# Patient Record
Sex: MALE | Race: BLACK OR AFRICAN AMERICAN | Employment: FULL TIME
[De-identification: names, ages, dates, MRNs, and addresses within clinical notes are randomized per-mention and may not be internally consistent; named-entity substitution may affect disease eponyms.]

---

## 2020-03-20 ENCOUNTER — NURSE TRIAGE (OUTPATIENT)
Dept: OTHER | Facility: CLINIC | Age: 40
End: 2020-03-20

## 2020-03-20 ENCOUNTER — HOSPITAL ENCOUNTER (EMERGENCY)
Age: 40
Discharge: HOME | End: 2020-03-21
Attending: EMERGENCY MEDICINE
Payer: COMMERCIAL

## 2020-03-20 PROCEDURE — 99285 EMERGENCY DEPT VISIT HI MDM: CPT

## 2020-03-21 ENCOUNTER — APPOINTMENT (OUTPATIENT)
Dept: GENERAL RADIOLOGY | Age: 40
End: 2020-03-21
Payer: COMMERCIAL

## 2020-03-21 VITALS
HEART RATE: 99 BPM | OXYGEN SATURATION: 97 % | SYSTOLIC BLOOD PRESSURE: 114 MMHG | TEMPERATURE: 98.4 F | DIASTOLIC BLOOD PRESSURE: 72 MMHG | RESPIRATION RATE: 18 BRPM

## 2020-03-21 LAB
EKG ATRIAL RATE: 83 BPM
EKG DIAGNOSIS: NORMAL
EKG P AXIS: 38 DEGREES
EKG P-R INTERVAL: 164 MS
EKG Q-T INTERVAL: 392 MS
EKG QRS DURATION: 84 MS
EKG QTC CALCULATION (BAZETT): 460 MS
EKG R AXIS: 49 DEGREES
EKG T AXIS: 15 DEGREES
EKG VENTRICULAR RATE: 83 BPM
S PYO AG THROAT QL: NEGATIVE

## 2020-03-21 PROCEDURE — 93005 ELECTROCARDIOGRAM TRACING: CPT

## 2020-03-21 PROCEDURE — 87880 STREP A ASSAY W/OPTIC: CPT

## 2020-03-21 PROCEDURE — 71046 X-RAY EXAM CHEST 2 VIEWS: CPT

## 2020-03-21 PROCEDURE — 6370000000 HC RX 637 (ALT 250 FOR IP): Performed by: EMERGENCY MEDICINE

## 2020-03-21 PROCEDURE — 87081 CULTURE SCREEN ONLY: CPT

## 2020-03-21 RX ORDER — IBUPROFEN 800 MG/1
800 TABLET ORAL ONCE
Status: COMPLETED | OUTPATIENT
Start: 2020-03-21 | End: 2020-03-21

## 2020-03-21 RX ORDER — KETOROLAC TROMETHAMINE 30 MG/ML
30 INJECTION, SOLUTION INTRAMUSCULAR; INTRAVENOUS ONCE
Status: DISCONTINUED | OUTPATIENT
Start: 2020-03-21 | End: 2020-03-21

## 2020-03-21 RX ORDER — NAPROXEN 500 MG/1
500 TABLET ORAL 2 TIMES DAILY PRN
Qty: 20 TABLET | Refills: 0 | Status: SHIPPED | OUTPATIENT
Start: 2020-03-21 | End: 2020-03-28

## 2020-03-21 RX ORDER — ACETAMINOPHEN 500 MG
1000 TABLET ORAL ONCE
Status: COMPLETED | OUTPATIENT
Start: 2020-03-21 | End: 2020-03-21

## 2020-03-21 RX ORDER — LIDOCAINE HYDROCHLORIDE 20 MG/ML
15 SOLUTION OROPHARYNGEAL ONCE
Status: DISCONTINUED | OUTPATIENT
Start: 2020-03-21 | End: 2020-03-21

## 2020-03-21 RX ADMIN — IBUPROFEN 800 MG: 800 TABLET, FILM COATED ORAL at 00:58

## 2020-03-21 RX ADMIN — ACETAMINOPHEN 1000 MG: 500 TABLET ORAL at 00:58

## 2020-03-21 ASSESSMENT — PAIN SCALES - GENERAL
PAINLEVEL_OUTOF10: 1
PAINLEVEL_OUTOF10: 1

## 2020-03-21 ASSESSMENT — PAIN DESCRIPTION - LOCATION: LOCATION: HEAD

## 2020-03-21 ASSESSMENT — PAIN DESCRIPTION - PAIN TYPE: TYPE: ACUTE PAIN

## 2020-03-21 ASSESSMENT — PAIN DESCRIPTION - DESCRIPTORS: DESCRIPTORS: HEADACHE

## 2020-03-21 NOTE — TELEPHONE ENCOUNTER
Reason for Disposition   SEVERE chest pain    Protocols used: CHEST PAIN-ADULT-AH    Pt's wife states pt has had a sore throat since Sunday and  Was sent home  From work. Since this morning he has developed chest pain that he is experiencing currently. Given disposition recommendation.

## 2020-03-21 NOTE — ED PROVIDER NOTES
Date Taking? Authorizing Provider   meloxicam (MOBIC) 7.5 MG tablet Take 1 tablet by mouth daily 3/25/16   Fadumo Fox MD       Allergies as of 03/20/2020    (No Known Allergies)       Past Medical History:   Diagnosis Date    GERD (gastroesophageal reflux disease)     gerd    Migraine     Psychiatric problem     depression anxiety    Tachycardia         Surgical History: History reviewed. No pertinent surgical history.      Family History:    Family History   Problem Relation Age of Onset    High Blood Pressure Mother     High Cholesterol Mother     Cancer Father     Heart Disease Maternal Grandfather     Stroke Maternal Grandfather        Social History     Socioeconomic History    Marital status: Single     Spouse name: Not on file    Number of children: Not on file    Years of education: Not on file    Highest education level: Not on file   Occupational History    Not on file   Social Needs    Financial resource strain: Not on file    Food insecurity     Worry: Not on file     Inability: Not on file   [employer] needs     Medical: Not on file     Non-medical: Not on file   Tobacco Use    Smoking status: Never Smoker    Smokeless tobacco: Never Used   Substance and Sexual Activity    Alcohol use: Yes     Comment: rarely     Drug use: No    Sexual activity: Yes     Partners: Female   Lifestyle    Physical activity     Days per week: Not on file     Minutes per session: Not on file    Stress: Not on file   Relationships    Social connections     Talks on phone: Not on file     Gets together: Not on file     Attends Evangelical service: Not on file     Active member of club or organization: Not on file     Attends meetings of clubs or organizations: Not on file     Relationship status: Not on file    Intimate partner violence     Fear of current or ex partner: Not on file     Emotionally abused: Not on file     Physically abused: Not on file     Forced sexual activity: Not on file him we do not have any testing available. Patient is amenable to doing the EKG and the chest x-ray. Patient understands that if I cannot do the lab draw that I cannot reduce the risks of the chest discomfort is is cardiac related, he understands this but does not want to do that at this time. He understands that as it stands his risk of a cardiac origin for this chest discomfort is significantly elevated at, greater than 10%. - Patient seen and evaluated in room 5    Wells Criteria: To assess patient for likelihood of a pulmonary embolism. Physical findings suggestive of DVT (unilateral leg swelling, calf or thigh tenderness):+0 No  No alternative diagnosis better explains the illness:+0 No  Tachycardia with pulse > 100:+0 No  Immobilization (?3 days) or surgery in the previous four weeks:+0 No  Prior history of DVT or pulmonary embolism:+0 No  Presence of hemoptysis:+0 No  Presence of malignancy:+0 No    Pulmonary embolism risk score interpretation: 0. This falls under the following category: Score of < 2, which indicates a low probability    PERC Rule:  Applicable in this patient who has low clinical suspicion for pulmonary embolism. Age < 48years old: Yes  Heart rate < 100 bpm: Yes  Oxygen saturation > 95%: Yes  Hemoptysis: No  Exogenous estrogen use: No  Prior history of DVT or PE: No  Unilateral leg swelling: No  Surgery or significant trauma in the past 4 weeks: No    Based on the above, PE can effectively be ruled out without further testing. I completed a structured, evidence-based clinical evaluation to screen for pulmonary embolus in this patient. The evidence indicates that the patient is very low risk for pulmonary embolus, and this is consistent with my clinical intuition. The risk of further testing, imaging or hospitalization is likely higher than the risk of the patient having a pulmonary embolus.  It is, therefore, in the best interest of the patient not to do additional emergent testing or be hospitalized. I have discussed with the patient my clinical impression and the result of an evidence-based clinical evaluation to screen for pulmonary embolus, as well as the risk of further testing and hospitalization. The evidence shows that the risk for pulmonary embolus is about 1% or less. Although the risk of pulmonary has not been eliminated, the risks of further testing or hospitalization likely exceed the benefit, and the patient declines further emergent evaluation or hospitalization for pulmonary embolus. THORACIC AORTIC DISSECTION SCREENING (TADS):    Associated New Neuro Deficies?: +0  No  Radial/Femoral Pulses Feel Uneven?: +0  No  Pain Maximal at Onset or Abrupt?: +0  No  Pain severe, ripping, or tearing?: +0  No  Migrates:  Chest, back, or abdomen?: +0  No  Chest XR Normal?: -1 Yes  A normal chest x-ray is defined as 1. Normal mediastinum, and 2. No pleural effusion, and 3. no apical pleural (curb density of the lung apex)    TADS score: < 0. This falls under the following category: Score of 0, odds of aortic dissection is <  1/1000, no further workup needed regarding the aorta and supports discharge    I engaged in a shared decision making discussion with the patient about the risk and potential benefits of CT scanning and they concurred with the plan to proceed without a CT scan as the risk is deemed a outweigh any potential benefit at this time. HEART SCORE: Unable to complete heart score as patient is refusing lab draws      This is a very pleasant patient with chest pain. On physical exam, patient does not have any abnormal heart or lung sounds.   The work up in the ED indicates patient is without significant evidence of acute coronary syndrome, pulmonary embolism, thoracic aortic dissection, pericarditis, pneumothorax, esophageal rupture, pneumonia, toxicity, shock, sepsis, unstable arrhythmia, hemodynamic or cardiopulmonary instability, herpes zoster, or any disease medications. New Prescriptions    NAPROXEN (NAPROSYN) 500 MG TABLET    Take 1 tablet by mouth 2 times daily as needed for Pain       Pt is in stable condition upon Discharge to home. The note was completed using Dragon voice recognition transcription. Every effort was made to ensure accuracy; however, inadvertent transcription errors may be present despite my best efforts to edit errors.     Emani Wynn MD  [address] MD  03/21/20 6038

## 2020-03-22 LAB — S PYO THROAT QL CULT: NORMAL

## 2021-02-10 ENCOUNTER — HOSPITAL ENCOUNTER (EMERGENCY)
Age: 41
Discharge: HOME | End: 2021-02-10
Payer: OTHER GOVERNMENT

## 2021-02-10 ENCOUNTER — APPOINTMENT (OUTPATIENT)
Dept: GENERAL RADIOLOGY | Age: 41
End: 2021-02-10
Payer: OTHER GOVERNMENT

## 2021-02-10 VITALS
OXYGEN SATURATION: 97 % | BODY MASS INDEX: 42.66 KG/M2 | HEART RATE: 99 BPM | WEIGHT: 315 LBS | TEMPERATURE: 99.3 F | HEIGHT: 72 IN | DIASTOLIC BLOOD PRESSURE: 94 MMHG | RESPIRATION RATE: 16 BRPM | SYSTOLIC BLOOD PRESSURE: 136 MMHG

## 2021-02-10 DIAGNOSIS — Z20.822 PERSON UNDER INVESTIGATION FOR COVID-19: ICD-10-CM

## 2021-02-10 DIAGNOSIS — J06.9 UPPER RESPIRATORY TRACT INFECTION, UNSPECIFIED TYPE: Primary | ICD-10-CM

## 2021-02-10 PROCEDURE — 71046 X-RAY EXAM CHEST 2 VIEWS: CPT

## 2021-02-10 PROCEDURE — U0003 INFECTIOUS AGENT DETECTION BY NUCLEIC ACID (DNA OR RNA); SEVERE ACUTE RESPIRATORY SYNDROME CORONAVIRUS 2 (SARS-COV-2) (CORONAVIRUS DISEASE [COVID-19]), AMPLIFIED PROBE TECHNIQUE, MAKING USE OF HIGH THROUGHPUT TECHNOLOGIES AS DESCRIBED BY CMS-2020-01-R: HCPCS

## 2021-02-10 PROCEDURE — 99282 EMERGENCY DEPT VISIT SF MDM: CPT

## 2021-02-11 ENCOUNTER — CARE COORDINATION (OUTPATIENT)
Dept: CARE COORDINATION | Age: 41
End: 2021-02-11

## 2021-02-11 LAB — SARS-COV-2, PCR: DETECTED

## 2021-02-11 NOTE — CARE COORDINATION
ACM contacted Elmer Palacios to follow up on [facility]er ED visit on 2/10/2021 Dx:Upper respiratory tract infection, unspecified type Person under investigation for COVID-19  AVS:  Please return to the emergency room for new or worsening symptoms  including but not limited to oxygen saturation less than 92%, chest pain,  shortness of breath, abdominal pain, severe nausea or vomiting,  numbness, weakness, coughing blood, calf swelling or pain, any other  symptoms you are concerned about. CX:   No acute process.       Bibasilar hypoaeration       ACM spoke with Elmer Palacios today. He reports he is feeling better. His pulse ox is 95-96% at rest.  He is c/o \"runny nose\". He denies cough. He denies fever and sweats and chills. Advised to isolate pending COVID results. Encouraged to activate My Chart for results. Reviewed CDC guidelines for isolation and quarantining. Discussed preventive measures. Encouraged hydration and rest.   Offered to establish with PCP. Declined. Explained benefit of continuity of care. Answered questions regarding medications. Agreeable to LOOP follow up     Patient contacted regarding [address] Discussed COVID-19 related testing which was pending at this time. Test results were pending. Patient informed of results, if available? N/A    Care Transition Nurse/ Ambulatory Care Manager contacted the patient by telephone to perform post discharge assessment. Call within 2 business days of discharge: Yes. Verified name and  with patient as identifiers. Provided introduction to self, and explanation of the CTN/ACM role, and reason for call due to risk factors for infection and/or exposure to COVID-19. Symptoms reviewed with patient who verbalized the following symptoms: fatigue, no new symptoms, no worsening symptoms and runny nose. denies SOB and cough. Due to no new or worsening symptoms encounter was not routed to provider for escalation.  Discussed follow-up appointments. If no appointment was previously scheduled, appointment scheduling offered: Yes  [facility] follow up appointment(s): No future appointments. Non-[facility] follow up appointment(s):     Non-face-to-face services provided:  Scheduled appointment with PCP-declined  Obtained and reviewed discharge summary and/or continuity of care documents  Reviewed and followed up on pending diagnostic tests and treatments-reviewed CX and advised on pending results. Education of patient/family/caregiver/guardian to support self-management-encourage hydration, rest and advised on symptoms to monitor  Assessment and support for treatment adherence and medication management-discussed meds and advised he continue Flexeril and Seraquil     Advance Care Planning:   Does patient have an Advance Directive:  not on file; education provided. Patient has following risk factors of: Obesity. CTN/ACM reviewed discharge instructions, medical action plan and red flags such as increased shortness of breath, increasing fever and signs of decompensation with patient who verbalized understanding. Discussed exposure protocols and quarantine with CDC Guidelines What to do if you are sick with coronavirus disease 2019.  Patient was given an opportunity for questions and concerns. . CTN/ACM provided contact information for future needs. Agreeable to LOOP. Reviewed and educated patient on any new and changed medications related to discharge diagnosis     Patient/family/caregiver given information for GetWell Loop and agrees to enroll yes  Patient's preferred e-mail: [email or web address]   Patient's preferred phone number: [phone] (M)  Based on Loop alert triggers, patient will be contacted by nurse care manager for worsening symptoms. Pt will be further monitored by COVID Loop Team based on severity of symptoms and risk factors.

## 2021-02-12 ASSESSMENT — ENCOUNTER SYMPTOMS
VOMITING: 0
TROUBLE SWALLOWING: 0
ABDOMINAL PAIN: 0
SORE THROAT: 1
SHORTNESS OF BREATH: 0
VOICE CHANGE: 0
EYE PAIN: 0
NAUSEA: 0
RHINORRHEA: 1
BACK PAIN: 0
COUGH: 1

## 2021-02-12 NOTE — ED PROVIDER NOTES
[address] ED  EMERGENCY DEPARTMENT ENCOUNTER        Pt Name: Blaise Taylor  MRN: [id number]  [place] 1980  Date of evaluation: 2/10/2021  Provider: JEWELL Rider  PCP: No primary care provider on file. HANDY. I have evaluated this patient. My supervising physician was available for consultation. CHIEF COMPLAINT       Chief Complaint   Patient presents with    Concern For COVID-19     sore throat about a week, runny nose,        HISTORY OF PRESENT ILLNESS   (Location, Timing/Onset, Context/Setting, Quality, Duration, Modifying Factors, Severity, Associated Signs and Symptoms)  Note limiting factors. Blaise Taylor is a 41 y.o. male presents to the emergency department with concern for COVID-19. Patient began to have a nonproductive cough, rhinorrhea, sore throat on 2/4. Wife has similar symptoms, no other known potential exposures. Denies tongue lip or throat swelling, inability to swallow solids or liquids, excessive drooling or spitting, fever, chest pain, shortness of breath, abdominal pain, nausea, vomiting, numbness, weakness, hemoptysis, calf swelling or pain. Denies alleviating or aggravating factors. Nursing Notes were all reviewed and agreed with or any disagreements were addressed in the HPI. REVIEW OF SYSTEMS    (2-9 systems for level 4, 10 or more for level 5)     Review of Systems   Constitutional: Negative for fever. HENT: Positive for rhinorrhea and sore throat. Negative for drooling, trouble swallowing and voice change. Eyes: Negative for pain and visual disturbance. Respiratory: Positive for cough. Negative for shortness of breath. Cardiovascular: Negative for chest pain. Gastrointestinal: Negative for abdominal pain, nausea and vomiting. Genitourinary: Negative for dysuria and frequency. Musculoskeletal: Negative for back pain and neck pain. Skin: Negative for rash.    Neurological: Negative for weakness, numbness and headaches. Psychiatric/Behavioral: Negative for confusion. Positives and Pertinent negatives as per HPI. Except as noted above in the ROS, all other systems were reviewed and negative. PAST MEDICAL HISTORY     Past Medical History:   Diagnosis Date    COVID-19 02/10/2021    GERD (gastroesophageal reflux disease)     gerd    Migraine     Psychiatric problem     depression anxiety    Tachycardia          SURGICAL HISTORY   History reviewed. No pertinent surgical history. [address]       Discharge Medication List as of 2/10/2021  6:16 PM      CONTINUE these medications which have NOT CHANGED    Details   naproxen (NAPROSYN) 500 MG tablet Take 1 tablet by mouth 2 times daily as needed for Pain, Disp-20 tablet, R-0Print      meloxicam (MOBIC) 7.5 MG tablet Take 1 tablet by mouth daily, Disp-10 tablet, R-0               ALLERGIES     Patient has no known allergies. FAMILYHISTORY       Family History   Problem Relation Age of Onset    High Blood Pressure Mother     High Cholesterol Mother     Cancer Father     Heart Disease Maternal Grandfather     Stroke Maternal Grandfather           SOCIAL HISTORY       Social History     Tobacco Use    Smoking status: Never Smoker    Smokeless tobacco: Never Used   Substance Use Topics    Alcohol use: Yes     Comment: rarely     Drug use: No       SCREENINGS             PHYSICAL EXAM    (up to 7 for level 4, 8 or more for level 5)     ED Triage Vitals [02/10/21 1625]   BP Temp Temp src Pulse Resp SpO2 Height Weight   (!) 136/94 99.3 °F (37.4 °C) -- 99 16 97 % 6' (1.829 m) (!) 320 lb (145.2 kg)       Physical Exam  Vitals signs reviewed. Constitutional:       Appearance: He is not diaphoretic. HENT:      Nose: No congestion or rhinorrhea. Mouth/Throat:      Mouth: Mucous membranes are moist.      Pharynx: Oropharynx is clear. No oropharyngeal exudate or posterior oropharyngeal erythema. Comments: Uvula midline.   No tonsillar swelling or exudates. No trismus. Eyes:      General: No scleral icterus. Conjunctiva/sclera: Conjunctivae normal.   Neck:      Musculoskeletal: Normal range of motion and neck supple. No neck rigidity or muscular tenderness. Cardiovascular:      Rate and Rhythm: Normal rate and regular rhythm. Pulses: Normal pulses. Heart sounds: Normal heart sounds. No murmur. No friction rub. No gallop. Pulmonary:      Effort: Pulmonary effort is normal. No respiratory distress. Breath sounds: Normal breath sounds. No stridor. No wheezing, rhonchi or rales. Musculoskeletal: Normal range of motion. Lymphadenopathy:      Cervical: No cervical adenopathy. Skin:     General: Skin is warm and dry. Neurological:      General: No focal deficit present. Mental Status: He is alert and oriented to person, place, and time. Sensory: No sensory deficit. Motor: No weakness. Gait: Gait normal.   Psychiatric:         Mood and Affect: Mood normal.         Behavior: Behavior normal.         DIAGNOSTIC RESULTS   LABS:    Labs Reviewed   COVID-19 - Abnormal; Notable for the following components:       Result Value    SARS-CoV-2, PCR DETECTED (*)     All other components within normal limits    Narrative:     Performed at:  [address]   Phone [phone]       All other labs were within normal range or not returned as of this dictation. EKG: All EKG's are interpreted by the Emergency Department Physician in the absence of a cardiologist.  Please see their note for interpretation of EKG.       RADIOLOGY:   Non-plain film images such as CT, Ultrasound and MRI are read by the radiologist. Plain radiographic images are visualized and preliminarily interpreted by the ED Provider with the below findings:        Interpretation per the Radiologist below, if available at the time of this note:    XR CHEST (2 VW) Final Result   No acute process. Bibasilar hypoaeration. Xr Chest (2 Vw)    Result Date: 2/10/2021  EXAMINATION: TWO XRAY VIEWS OF THE CHEST 2/10/2021 4:59 pm COMPARISON: 03/21/2020 HISTORY: ORDERING SYSTEM PROVIDED HISTORY: Cough TECHNOLOGIST PROVIDED HISTORY: Reason for exam:->Cough Reason for Exam: Concern For COVID-19 (sore throat about a week, runny nose, ) FINDINGS: The lungs are without acute focal process. Bibasilar hypoaeration. There is no effusion or pneumothorax. The cardiomediastinal silhouette is stable. The osseous structures are stable. No acute process. Bibasilar hypoaeration. PROCEDURES   Unless otherwise noted below, none     Procedures    CRITICAL CARE TIME   N/A    CONSULTS:  None      EMERGENCY DEPARTMENT COURSE and DIFFERENTIAL DIAGNOSIS/MDM:   Vitals:    Vitals:    02/10/21 1625   BP: (!) 136/94   Pulse: 99   Resp: 16   Temp: 99.3 °F (37.4 °C)   SpO2: 97%   Weight: (!) 320 lb (145.2 kg)   Height: 6' (1.829 m)       Patient was given the following medications:  Medications - No data to display        66-year-old male presents the emergency department with concern for COVID-19. Presents with wife and son who have similar symptoms. No tonsillar swelling or exudates, cervical adenopathy, low concern for bacterial pharyngitis. Low suspicion for Dipti's angina, peritonsillar abscess, retropharyngeal abscess. Chest x-ray without evidence of pneumonia. I do have a high suspicion for COVID-19 or other viral upper respiratory infection. He does not have hypoxia, respiratory distress warranting admission for COVID-19 rule out. Pulse oximeter provided. COVID-19 PCR swab sent.   Instructed to return to the emergency department for new or worsening symptoms including but not limited to oxygen saturation less than 92%, chest pain, shortness of breath, inability to swallow solids or liquids, excessive drooling or spitting, limited or painful movement of jaw or neck, any other symptoms they are concerned about. Verbal and written discharge instructions and return precautions given. FINAL IMPRESSION      1. Upper respiratory tract infection, unspecified type    2. Person under investigation for COVID-19          DISPOSITION/PLAN   DISPOSITION Decision To Discharge 02/10/2021 05:59:48 PM      PATIENT REFERREDTO:  [facility]) Pre-Services  [phone]  Call in 1 day  Schedule primary care follow-up ideally to be seen within 2 to 3 days.       DISCHARGE MEDICATIONS:  Discharge Medication List as of 2/10/2021  6:16 PM          DISCONTINUED MEDICATIONS:  Discharge Medication List as of 2/10/2021  6:16 PM                 (Please note that portions of this note were completed with a voice recognition program.  Efforts were made to edit the dictations but occasionally words are mis-transcribed.)    Remus Jeans, PA (electronically signed)         Remus Jeans, PA  02/12/21 [id number]

## 2024-06-14 ENCOUNTER — APPOINTMENT (OUTPATIENT)
Dept: GENERAL RADIOLOGY | Age: 44
End: 2024-06-14
Payer: OTHER GOVERNMENT

## 2024-06-14 ENCOUNTER — HOSPITAL ENCOUNTER (EMERGENCY)
Age: 44
Discharge: HOME | End: 2024-06-15
Payer: OTHER GOVERNMENT

## 2024-06-14 VITALS
SYSTOLIC BLOOD PRESSURE: 139 MMHG | RESPIRATION RATE: 16 BRPM | DIASTOLIC BLOOD PRESSURE: 98 MMHG | WEIGHT: 315 LBS | TEMPERATURE: 98.2 F | HEART RATE: 91 BPM | HEIGHT: 72 IN | BODY MASS INDEX: 42.66 KG/M2 | OXYGEN SATURATION: 96 %

## 2024-06-14 DIAGNOSIS — R05.2 SUBACUTE COUGH: Primary | ICD-10-CM

## 2024-06-14 PROCEDURE — 99283 EMERGENCY DEPT VISIT LOW MDM: CPT

## 2024-06-14 PROCEDURE — 71046 X-RAY EXAM CHEST 2 VIEWS: CPT

## 2024-06-14 PROCEDURE — 6370000000 HC RX 637 (ALT 250 FOR IP): Performed by: PHYSICIAN ASSISTANT

## 2024-06-14 RX ORDER — BENZONATATE 100 MG/1
100 CAPSULE ORAL ONCE
Status: COMPLETED | OUTPATIENT
Start: 2024-06-14 | End: 2024-06-14

## 2024-06-14 RX ADMIN — BENZONATATE 100 MG: 100 CAPSULE ORAL at 23:08

## 2024-06-14 ASSESSMENT — LIFESTYLE VARIABLES
HOW MANY STANDARD DRINKS CONTAINING ALCOHOL DO YOU HAVE ON A TYPICAL DAY: 1 OR 2
HOW OFTEN DO YOU HAVE A DRINK CONTAINING ALCOHOL: MONTHLY OR LESS

## 2024-06-14 ASSESSMENT — PAIN - FUNCTIONAL ASSESSMENT: PAIN_FUNCTIONAL_ASSESSMENT: NONE - DENIES PAIN

## 2024-06-15 RX ORDER — BENZONATATE 100 MG/1
100 CAPSULE ORAL 3 TIMES DAILY PRN
Qty: 30 CAPSULE | Refills: 0 | Status: SHIPPED | OUTPATIENT
Start: 2024-06-15 | End: 2024-06-25

## 2024-06-16 NOTE — ED PROVIDER NOTES
PNEUMONIA, MENINGITIS, OR URINARY TRACT INFECTION, thus I consider the discharge disposition reasonable. Also, there is no evidence or peritonitis, sepsis, or toxicity. Juan Mckinney and I have discussed the diagnosis and risks, and we agree with discharging home to follow-up with their primary doctor. We also discussed returning to the Emergency Department immediately if new or worsening symptoms occur. We have discussed the symptoms which are most concerning (e.g., changing or worsening pain, trouble swallowing or breating, neck stiffness, fever) that necessitate immediate return.    Final Impression    1. Subacute cough        Discharge Vital Signs:  Blood pressure (!) 139/98, pulse 91, temperature 98.2 °F (36.8 °C), temperature source Oral, resp. rate 16, height 1.829 m (6'), weight (!) 148.8 kg (328 lb), SpO2 96 %.     DISPOSITION  Patient was discharged to home in good condition.         Rashad Mendez PA-C  06/16/24 0004

## 2025-08-31 ENCOUNTER — HOSPITAL ENCOUNTER (EMERGENCY)
Age: 45
Discharge: HOME | End: 2025-08-31
Payer: OTHER GOVERNMENT

## 2025-08-31 VITALS
WEIGHT: 315 LBS | TEMPERATURE: 99 F | OXYGEN SATURATION: 95 % | HEART RATE: 105 BPM | SYSTOLIC BLOOD PRESSURE: 150 MMHG | RESPIRATION RATE: 18 BRPM | BODY MASS INDEX: 42.66 KG/M2 | DIASTOLIC BLOOD PRESSURE: 90 MMHG | HEIGHT: 72 IN

## 2025-08-31 DIAGNOSIS — K04.7 DENTAL ABSCESS: ICD-10-CM

## 2025-08-31 DIAGNOSIS — K02.9 PAIN DUE TO DENTAL CARIES: Primary | ICD-10-CM

## 2025-08-31 PROCEDURE — 99283 EMERGENCY DEPT VISIT LOW MDM: CPT

## 2025-08-31 PROCEDURE — 6370000000 HC RX 637 (ALT 250 FOR IP): Performed by: PHYSICIAN ASSISTANT

## 2025-08-31 RX ORDER — LIDOCAINE HYDROCHLORIDE 20 MG/ML
15 SOLUTION OROPHARYNGEAL PRN
Qty: 100 ML | Refills: 0 | Status: SHIPPED | OUTPATIENT
Start: 2025-08-31 | End: 2025-08-31

## 2025-08-31 RX ORDER — PENICILLIN V POTASSIUM 500 MG/1
500 TABLET, FILM COATED ORAL 4 TIMES DAILY
Qty: 40 TABLET | Refills: 0 | Status: SHIPPED | OUTPATIENT
Start: 2025-08-31 | End: 2025-08-31

## 2025-08-31 RX ORDER — PENICILLIN V POTASSIUM 500 MG/1
500 TABLET, FILM COATED ORAL 4 TIMES DAILY
Qty: 40 TABLET | Refills: 0 | Status: SHIPPED | OUTPATIENT
Start: 2025-08-31 | End: 2025-09-10

## 2025-08-31 RX ORDER — CHLORHEXIDINE GLUCONATE ORAL RINSE 1.2 MG/ML
15 SOLUTION DENTAL 2 TIMES DAILY
Qty: 210 ML | Refills: 0 | Status: SHIPPED | OUTPATIENT
Start: 2025-08-31 | End: 2025-08-31

## 2025-08-31 RX ORDER — PENICILLIN V POTASSIUM 250 MG/1
500 TABLET, FILM COATED ORAL ONCE
Status: COMPLETED | OUTPATIENT
Start: 2025-08-31 | End: 2025-08-31

## 2025-08-31 RX ORDER — LIDOCAINE HYDROCHLORIDE 20 MG/ML
15 SOLUTION OROPHARYNGEAL PRN
Qty: 100 ML | Refills: 0 | Status: SHIPPED | OUTPATIENT
Start: 2025-08-31

## 2025-08-31 RX ORDER — CHLORHEXIDINE GLUCONATE ORAL RINSE 1.2 MG/ML
15 SOLUTION DENTAL 2 TIMES DAILY
Qty: 210 ML | Refills: 0 | Status: SHIPPED | OUTPATIENT
Start: 2025-08-31 | End: 2025-09-07

## 2025-08-31 RX ADMIN — PENICILLIN V POTASSIUM 500 MG: 250 TABLET, FILM COATED ORAL at 18:53

## 2025-08-31 ASSESSMENT — LIFESTYLE VARIABLES
HOW OFTEN DO YOU HAVE A DRINK CONTAINING ALCOHOL: MONTHLY OR LESS
HOW MANY STANDARD DRINKS CONTAINING ALCOHOL DO YOU HAVE ON A TYPICAL DAY: 1 OR 2